# Patient Record
Sex: FEMALE | Race: WHITE | NOT HISPANIC OR LATINO | Employment: FULL TIME | URBAN - METROPOLITAN AREA
[De-identification: names, ages, dates, MRNs, and addresses within clinical notes are randomized per-mention and may not be internally consistent; named-entity substitution may affect disease eponyms.]

---

## 2023-12-09 ENCOUNTER — HOSPITAL ENCOUNTER (EMERGENCY)
Facility: CLINIC | Age: 33
Discharge: HOME | End: 2023-12-09
Attending: EMERGENCY MEDICINE | Admitting: EMERGENCY MEDICINE

## 2023-12-09 VITALS
TEMPERATURE: 97.7 F | DIASTOLIC BLOOD PRESSURE: 95 MMHG | RESPIRATION RATE: 14 BRPM | SYSTOLIC BLOOD PRESSURE: 138 MMHG | HEART RATE: 84 BPM | OXYGEN SATURATION: 97 %

## 2023-12-09 DIAGNOSIS — R45.851 SUICIDAL IDEATION: ICD-10-CM

## 2023-12-09 DIAGNOSIS — F10.920 ALCOHOLIC INTOXICATION WITHOUT COMPLICATION (H): ICD-10-CM

## 2023-12-09 PROBLEM — F90.2 ATTENTION DEFICIT HYPERACTIVITY DISORDER (ADHD), COMBINED TYPE: Status: ACTIVE | Noted: 2023-12-09

## 2023-12-09 PROBLEM — F41.1 GENERALIZED ANXIETY DISORDER: Status: ACTIVE | Noted: 2023-12-09

## 2023-12-09 PROCEDURE — 99283 EMERGENCY DEPT VISIT LOW MDM: CPT

## 2023-12-09 NOTE — CONSULTS
"Diagnostic Evaluation Consultation  Crisis Assessment    Patient Name: Charisma Saldaña  Age:  33 year old  Legal Sex: female  Gender Identity: female  Pronouns:   Race: White  Ethnicity: Data Unavailable  Language: English      Patient was assessed: Virtual: 1DayLater Crisis Assessment Start Time: 0846 Crisis Assessment Stop Time: 0920  Patient location: [facility] EMERGENCY DEPT                             ED16    Referral Data and Chief Complaint  Charisma Saldaña presents to the ED via EMS ([place]). Patient is presenting to the ED for the following concerns: Intoxication, Substance use, Suicidal ideation, Anxiety, Recent loss.   Factors that make the mental health crisis life threatening or complex are:  Charisma Saldaña is a [age] year old female who identifies as . Charisma has a hx of ADHD, Anxiety and Alcohol Abuse with suicidal ideation. The patient presents to the ED via EMS. The patient reports she was drinking last night with friends and blacked out. She reports she was told she called a friend and threatened suicide. Per chart, patient called mother and friend and threatened to take Nyquil and overdose on pills as a suicidal gesture. Patient reports her friend called 911 and she was transported to the hospital for evaluation. The patient denies feeling sad/depressed. She states she works full time and never calls in for work. She reports chronic anxiety for the past 5-6 years but states she does not want to talk about what happened during that time frame that started the anxiety. The patient reports normal appetite and sleeps roughly 6 hours per day which is normal for her. She reports drinking alcohol on weekends and is \"unsure how much.\" She reports occassionally \"blacking out\" and is unable to recall events. Now sober, the patient denies S/I, H/I, SIB, psychosis or access to weapons. She denies use of other substances. She reports when sober she does not have thoughts of " suicide or self harm. Patient admits it is concerning she has ended up in the ED for evaluation for suicide when intoxicated a handful of times. She reports her father suffered from alcohol abuse and 1.5 years ago fell when intoxicated and hit his head causing him to die. Patient reports she is not bothered by his passing. Patient states she lives with her best friend Lala and would like to go back home. She feels she has support from her mother, sister, brother and roommate. She is prescribed Adderall and previously took Sertraline and Buspar- but reports she was never compliant on the Sertraline or Buspar..      Informed Consent and Assessment Methods  Explained the crisis assessment process, including applicable information disclosures and limits to confidentiality, assessed understanding of the process, and obtained consent to proceed with the assessment.  Assessment methods included conducting a formal interview with patient, review of medical records, collaboration with medical staff, and obtaining relevant collateral information from family and community providers when available.  : done     Patient response to interventions: acceptance expressed, verbalizes understanding  Coping skills were attempted to reduce the crisis:  Patient notes using alcohol to cope with anxiety. She is agreeable to restarting therapy and discuss medications with a psychiatrist. She also is agreeable to C/D assessment. Patient enjoys watching [employer], working and being with friends and family.     History of the Crisis   The patient has a hx of BEATRIZ and Alcohol Abuse. The patient reports one previous suicide attempt at the age of 19 or 20 when she was intoxicated and attempted to overdose on Adderall. She reports one prior admission around the age of 18 and is unsure where she was admitted. She believes it was a one week hospitalization. The patient previously had a therapist but stopped attending about 5 years ago. No hx of  commitment.    Brief Psychosocial History  Family:  Single, Children no  Support System:  Parent(s), Sibling(s), Other (specify) (Mother, sister, brother, roommate Lala and friends.)  Employment Status:  employed full-time  Source of Income:  salary/wages  Financial Environmental Concerns:  none  Current Hobbies:  family functions, television/movies/videos  Barriers in Personal Life:       Significant Clinical History  Current Anxiety Symptoms:  anxious  Current Depression/Trauma:  apathy  Current Somatic Symptoms:     Current Psychosis/Thought Disturbance:     Current Eating Symptoms:     Chemical Use History:  Alcohol: Binge, Blackouts  Last Use:: 12/09/23  Benzodiazepines: None  Opiates: None  Cocaine: None  Marijuana: None  Other Use: None   Past diagnosis:  ADHD, Anxiety Disorder, Substance Use Disorder  Family history:  Substance Use Disorder (Father suffered from Alcohol Abuse, passed away 1.5 years ago due to falling when intoxicated and hitting his head.)  Past treatment:  Individual therapy, Primary Care, Inpatient Hospitalization, Psychiatric Medication Management  Details of most recent treatment:  The patient is prescribed Adderall and reports compliance.  Other relevant history:  Patient reports one prior admission around the age of 19 for 1 week, she cannot recall the location of the admission. Patient admits to one suicide attempt at the age of 20 when she was intoxicated and attempted to overdose on Adderall. Patient reports she previously attended individual therapy but stopped attending about 5 years ago.She sees her primary care doctor for medication management. She was previously prescribed Sertraline and Buspar but reports she was never compliant.       Collateral Information  Is there collateral information: Yes     Collateral information name, relationship, phone number:  mother Sloan, [phone]    What happened today: Got a phone call around 3:30 am this morning, police were there.  She wouldn't tell me what happened. The police hand cuffed her and hung up on me. I don't know exactly what happened. I saw her yesterday, she gave me a home from work yesterday and she was in a really good mood. She has done this before and she needs to address the concerns and alcohol use.     What is different about patient's functioning: Her issues started in high school, seemed better and went away to college and did well until Senior year and never finished. Father was an alcoholic and his family, was not a nice man, fell and hit his head. She is living in his house, where he fell and , this could be a trigger, unsure. She has been doing well, going to work regularly. I think she is isolating herself, says she wants to go home and go to bed. Was previously very social and had tons of friends. I didn't realize she is still drinking and don't allow it in my house.     Concern about alcohol/drug use:      What do you think the patient needs:      Has patient made comments about wanting to kill themselves/others: no (Never to me.)    If d/c is recommended, can they take part in safety/aftercare planning:  yes (Mother, older sister and roommate will be keeping an eye on her. Her brother lives nearby as well and will check on her. Mom will be in touch with the roommate as well.)    Additional collateral information:        Risk Assessment  [place] Suicide Severity Rating Scale Full Clinical Version:  Suicidal Ideation  Q1 Wish to be Dead (Lifetime): Yes  Q2 Non-Specific Active Suicidal Thoughts (Lifetime): Yes  3. Active Suicidal Ideation with any Methods (Not Plan) Without Intent to Act (Lifetime): No  Q4 Active Suicidal Ideation with Some Intent to Act, Without Specific Plan (Lifetime): Yes (Patient reports while intoxicated she attempted suicide at the age of 20 by overdosing on Adderall.)  Q5 Active Suicidal Ideation with Specific Plan and Intent (Lifetime): Yes (Patient reports while intoxicated she  attempted suicide at the age of 20 by overdosing on Adderall.)  Q6 Suicide Behavior (Lifetime): no     Suicidal Behavior (Lifetime)  Actual Attempt (Lifetime): Yes (Patient reports while intoxicated she attempted suicide at the age of 20 by overdosing on Adderall.)  Total Number of Actual Attempts (Lifetime): 1  Actual Attempt Description (Lifetime): Patient reports while intoxicated she attempted suicide at the age of 20 by overdosing on Adderall.  Has subject engaged in non-suicidal self-injurious behavior? (Lifetime): No  Interrupted Attempts (Lifetime): No  Aborted or Self-Interrupted Attempt (Lifetime): No  Preparatory Acts or Behavior (Lifetime): No    [place] Suicide Severity Rating Scale Recent:   Suicidal Ideation (Recent)  Q1 Wished to be Dead (Past Month): no  Q2 Suicidal Thoughts (Past Month): no (Denies, however when intoxicated last night she reportedly threatened suicide by overdosing on pills and Nyquil)  Intensity of Ideation (Recent)  Most Severe Ideation Rating (Past 1 Month):  (Denies, states she does not recall being suicidal last night.)  Frequency (Past 1 Month):  (Denies S/I, does not recall events of last night.)  Duration (Past 1 Month):  (Denies S/I, does not recall events of last night.)  Controllability (Past 1 Month):  (Denies S/I, does not recall events of last night.)  Deterrents (Past 1 Month): Does not apply (Denies S/I, does not recall events of last night.)  Reasons for Ideation (Past 1 Month): Does not apply  Suicidal Behavior (Recent)  Actual Attempt (Past 3 Months): No  Has subject engaged in non-suicidal self-injurious behavior? (Past 3 Months): No  Interrupted Attempts (Past 3 Months): No  Aborted or Self-Interrupted Attempt (Past 3 Months): No  Preparatory Acts or Behavior (Past 3 Months): No    Environmental or Psychosocial Events: loss of a loved one, ongoing abuse of substances  Protective Factors: Protective Factors: strong bond to family unit, community support, or  employment, able to access care without barriers, lives in a responsibly safe and stable environment    Does the patient have thoughts of harming others? Feels Like Hurting Others: no  Previous Attempt to Hurt Others: no  Is the patient engaging in sexually inappropriate behavior?: no    Is the patient engaging in sexually inappropriate behavior?  no        Mental Status Exam   Affect: Appropriate  Appearance: Appropriate  Attention Span/Concentration: Attentive  Eye Contact: Engaged    Fund of Knowledge: Appropriate   Language /Speech Content: Fluent  Language /Speech Volume: Normal  Language /Speech Rate/Productions: Normal  Recent Memory: Intact, Variable  Remote Memory: Intact, Variable  Mood: Anxious  Orientation to Person: Yes   Orientation to Place: Yes  Orientation to Time of Day: Yes  Orientation to Date: Yes     Situation (Do they understand why they are here?): Yes  Psychomotor Behavior: Normal  Thought Content: Clear  Thought Form: Intact     Mini-Cog Assessment  Number of Words Recalled:    Clock-Drawing Test:     Three Item Recall:    Mini-Cog Total Score:       Medication  Psychotropic medications:   Medication Orders - Psychiatric (From admission, onward)      None             Current Care Team  Patient Care Team:  Clinic, [facility] as PCP - General    Diagnosis  Patient Active Problem List   Diagnosis Code    Generalized anxiety disorder F41.1    Attention deficit hyperactivity disorder (ADHD), combined type F90.2       Primary Problem This Admission  Active Hospital Problems    Generalized anxiety disorder      Attention deficit hyperactivity disorder (ADHD), combined type        Clinical Summary and Substantiation of Recommendations   The patient does not pose an imminent threat to harm self or others. Patient reports she does not recall the incidents of last night but is aware she threatened suicide while she was intoxicated and that this has happend in the past. The patient  states she feels save returning home with her roommate Lala. Her mother, sister and brother will also stop by for support. The patient is agreeable to a C/D assessment, therapy and psychiatry however she does not have her updated insurance information available today. She will work with [facility] coordinator to get scheduled for individual therapy and psychiatry after discharge.                          Patient coping skills attempted to reduce the crisis:  Patient notes using alcohol to cope with anxiety. She is agreeable to restarting therapy and discuss medications with a psychiatrist. She also is agreeable to C/D assessment. Patient enjoys watching [employer], working and being with friends and family.    Disposition  Recommended disposition: Individual Therapy, Rule 25/MEKHI Assessment, Medication Management        Reviewed case and recommendations with attending provider. Attending Name: Dr. Saldaña       Attending concurs with disposition: yes       Patient and/or validated legal guardian concurs with disposition:   yes       Final disposition:  discharge    Legal status on admission:      Assessment Details   Total duration spent with the patient: 35 min     CPT code(s) utilized: 75643 - Psychotherapy for Crisis - 60 (30-74*) min    CALE Olivia, Psychotherapist  DEC - Triage & Transition Services  Callback: [phone]  12/9/2023  10:03 AM

## 2023-12-09 NOTE — ED PROVIDER NOTES
History     Chief Complaint:  Alcohol Intoxication and Suicidal       HPI   Charisma Saldaña is a 33 year old female who presents to the emergency department with alcohol intoxication.  She reports that she drank quite a bit of alcohol last night and then called a family member and said she was going to hurt herself.  Patient remembers saying this but did not mean it.  She denies hurting herself.  She denies being suicidal.  Denies homicidal ideation.  Drinks daily but denies withdrawal history.    Independent Historian:   None - Patient Only    Medications:    Amphetamine-Dextroamphetamine (ADDERALL PO)  BusPIRone HCl (BUSPAR PO)  Sertraline HCl (ZOLOFT PO)      Past Medical History:    ADHD      Physical Exam   Patient Vitals for the past 24 hrs:   BP Temp Temp src Pulse Resp SpO2   12/09/23 0521 -- 97.7  F (36.5  C) Temporal -- 14 97 %   12/09/23 0459 (!) 138/95 -- -- 84 -- --        Physical Exam  General: Sitting up in bed  Eyes:  The pupils are equal and round    Conjunctivae and sclerae are normal  ENT:    Atraumatic face  Neck:  Normal range of motion  CV:  Regular rate     Skin warm and well perfused   Resp:  Non labored breathing on room air    No tachypnea    No cough heard  MS:  Normal muscular tone  Skin:  No rash or acute skin lesions noted  Neuro:   Awake, alert.      Speech is slurred, appears intoxicated    Face is symmetric.     Moves all extremities equally  Psych: Flat affect    Emergency Department Course     Emergency Department Course & Assessments:    Social Determinants of Health affecting care:   None    Disposition:  Pending, patient signed out to Dr. Bernal pending DEC assessment when sober    Impression & Plan      Medical Decision Making:  Charisma Saldaña is a 33-year-old female who present to the emergency department with alcohol intoxication.  She reportedly said that she was suicidal to a family member.  Patient vaguely remembers saying something but denies actually being  suicidal.  Reports that she drinks most days.  She is not in withdrawal at this time as she appears intoxicated.  Denies actually harming herself.  Will need DEC assessment when sober    Diagnosis:    ICD-10-CM    1. Alcoholic intoxication without complication (H24)  F10.920       2. Suicidal ideation  R45.851          12/9/2023   Swati Lawrence MD Goertz, Maria Kristine, MD  12/09/23 0615

## 2023-12-09 NOTE — ED NOTES
Patient awake. Patient updated on the condition that brought her int the ED. Patient informed of her [place] and assisted with a phone call.

## 2023-12-09 NOTE — ED PROVIDER NOTES
Signed out to myself awaiting DEC assessment.  Was assessed and received call at 9:50.  Patient is not suicidal.  She has a history of becoming suicidal when intoxicated.  She is safe for discharge and resources are being given.     Donavan Bernal MD  12/09/23 2253

## 2023-12-09 NOTE — DISCHARGE INSTRUCTIONS
Substance Use Treatment (Rule 25) Information -     To access chemical dependency treatment you must have an assessment complete or have an updated assessment within 30 days of starting any program.    Information for this to be completed and to secure funding if you have medical assistance or no insurance can be found through your [facility]'s chemical health intake line.      If you have private insurance contact the customer service number on the back of your insurance card to determine the required steps for accessing services through your insurance provider.     [facility] RULE 25 INFORMATION -   [place] - [phone]  [place]  [phone]  [facility] [phone]  [facility] [phone]  [facility] [phone]  [place] - [phone]  [place] - [phone]  [facility] [phone]      Once approved for funding you can connect with a facility that does Rule 25 assessment.     The following facilities offer Rule 25 assessments -     [facility]  [phone]  [facility] - Outpatient Mental Health and Addiction   [address] [facility] [address]    [facility] Outpatient Alcohol and Drug Abuse Program (ADAP)  [phone]  [address] [place] [address]  *Walk in assessments also available M-F starting at 8 am.     [facility]  [phone]  [address] [facility] [address]       [facility]   [phone]  [facility]  [address] [facility] [address]    [facility]   [phone]  [address] [facility] [address]    [facility]  [phone] - press 1  Multiple clinic locations    [facility]   [phone]  [address] [place] [address] E  [facility] [address]    [facility]  [phone]  [address] [facility] [address]    [facility]  [phone]  [address] [facility] [address]          If you are intoxicated  You may be required to detox at a detox facility  before starting treatment.    [facility]- [address] [facility].  [place], [place].    [phone]    [facility]: [phone]  [facility]: [phone]  [facility]: [phone]      *All information subject to change by facility- connect by phone prior to arrival to verify available services. Information listed is not an endorsement of particular program and is populated using a few of the available resources in the area.     Additional support while to provide support while you await assessment and any recommendations-  [facility] is providing telehealth services during the COVID-19 outbreak to ensure ongoing access to high-quality treatment for substance use and co-occurring mental health conditions.  COVID-19 poses unprecedented challenges to clients and providers. [facility], one of [place] s largest non-profit extended-care behavioral healthcare providers, successfully transitioned more than nine hundred outpatient clients to telehealth. Its licensed clinicians are delivering individual and group therapy via computers, mobile devices, and phones.  [facility] is now extending telehealth services to any adult [place] resident to ensure uninterrupted access to crucial care during this challenging time.  [facility] is accredited by The Joint Commission and utilizes evidence-based care   In-network with most major insurance companies, accept Medical Assistance (i.e. MA/PMAP/CCDTF), and private pay   Conducting virtual assessments and intakes   Individual and group telehealth sessions available   Specialized groups and trauma treatment available   Employing secure telehealth platforms   Partnering with recovery residences in the [facility], [facility], and other [facility] to help clients access high-quality recovery housing when clinically indicated.  Refer a client by contacting a [facility] admissions representative using the following information:  [facility] ([address] [place] [address])   Contact: Estefania Licona, Admissions Supervisor ([email or web address])   Office: [phone] Fax: [phone]  [facility] ([address] [place] [address])   Contact: Estefania Licona, Admissions Supervisor ([email or web address])   Office: [phone] Fax: [phone]  [facility] ([address] [place] [address])   Contact: Cynthia Harley, Admissions Supervisor ([email or web address])   Office: [phone] Fax: [phone]  [facility] ([address] [place] [address])   Contact: Bettina Regan, Admissions Supervisor ([email or web address])   Office: [phone] Fax: [phone]     Aftercare Plan  If I am feeling unsafe or I am in a crisis, I will:   Contact my established care providers   Call the National Suicide Prevention Lifeline: 988  Go to the nearest emergency room   Call 911   Your [facility] has a mental health crisis team you can call 24/7: [facility] Mobile Crisis  [phone]     Refrain from use of alcohol  [facility] will contact you to schedule individual therapy and psychiatry.  Follow up with primary care doctor.  Complete chemical dependency assessment and follow recommendations       Crisis Lines  Crisis Text Line  Text 186626  You will be connected with a trained live crisis counselor to provide support.    Por espanol, texto  HAI a 864667 o texto a 442-AYUDAME en WhatsApp    The [employer] (LGBTQ Youth Crisis Line)  1.508.991.1944  text START to 360-764      Community Resources  Fast Tracker  Linking people to mental health and substance use disorder resources  fasttrackermn.org     [place] Mental Health Warm Line  Peer to peer support  Monday thru Saturday, 12 pm to 10 pm  884.211.7378 or 1.139.977.5625  Text \"Support\" to 29992    National [place] on Mental Illness (KATTY)  800.708.8285 or 1.888.KATTY.HELPS      Mental Health Apps  My3  " https://my3app.org/    VirtualHopeBox  https://[place]a/apps/virtual-hope-box/      Additional Information  Today you were seen by a licensed mental health professional through Triage and Transition services, [facility] (P)  for a crisis assessment in the Emergency Department at [facility].  It is recommended that you follow up with your established providers (psychiatrist, mental health therapist, and/or primary care doctor - as relevant) as soon as possible. Coordinators from [facility] will be calling you in the next 24-48 hours to ensure that you have the resources you need.  You can also contact [facility] coordinators directly at [phone]. You may have been scheduled for or offered an appointment with a mental health provider. [facility] maintains an extensive network of licensed behavioral health providers to connect patients with the services they need.  We do not charge providers a fee to participate in our referral network.  We match patients with providers based on a patient's specific needs, insurance coverage, and location.  Our first effort will be to refer you to a provider within your care system, and will utilize providers outside your care system as needed.       Joe Dubois, CALE  12/9/2023  10:10 AM

## 2023-12-09 NOTE — ED TRIAGE NOTES
Pt arrived by ambulance for alcohol intoxication and SI. After pt was intoxicated she called her mother and her friend and told them that she was going to drink a bottle of Nyquil and take a whole bunch of pills. Pt denies any of this happened per EMS.      Triage Assessment (Adult)       Row Name 12/09/23 0514          Triage Assessment    Airway WDL WDL     Additional Documentation Breath Sounds (Group)        Respiratory WDL    Respiratory WDL WDL        Skin Circulation/Temperature WDL    Skin Circulation/Temperature WDL WDL        Cardiac WDL    Cardiac WDL WDL     Cardiac Rhythm NSR        Peripheral/Neurovascular WDL    Peripheral Neurovascular WDL WDL        Cognitive/Neuro/Behavioral WDL    Cognitive/Neuro/Behavioral WDL WDL

## 2024-01-13 ENCOUNTER — HEALTH MAINTENANCE LETTER (OUTPATIENT)
Age: 34
End: 2024-01-13

## 2025-01-25 ENCOUNTER — HEALTH MAINTENANCE LETTER (OUTPATIENT)
Age: 35
End: 2025-01-25